# Patient Record
Sex: FEMALE | NOT HISPANIC OR LATINO
[De-identification: names, ages, dates, MRNs, and addresses within clinical notes are randomized per-mention and may not be internally consistent; named-entity substitution may affect disease eponyms.]

---

## 2024-05-13 PROBLEM — Z00.00 ENCOUNTER FOR PREVENTIVE HEALTH EXAMINATION: Status: ACTIVE | Noted: 2024-05-13

## 2024-05-14 ENCOUNTER — APPOINTMENT (OUTPATIENT)
Dept: UROLOGY | Facility: CLINIC | Age: 49
End: 2024-05-14
Payer: COMMERCIAL

## 2024-05-14 VITALS
WEIGHT: 125 LBS | HEART RATE: 72 BPM | OXYGEN SATURATION: 100 % | TEMPERATURE: 97.9 F | DIASTOLIC BLOOD PRESSURE: 61 MMHG | HEIGHT: 66 IN | SYSTOLIC BLOOD PRESSURE: 93 MMHG | BODY MASS INDEX: 20.09 KG/M2

## 2024-05-14 DIAGNOSIS — Z78.9 OTHER SPECIFIED HEALTH STATUS: ICD-10-CM

## 2024-05-14 DIAGNOSIS — M62.89 OTHER SPECIFIED DISORDERS OF MUSCLE: ICD-10-CM

## 2024-05-14 DIAGNOSIS — Z87.891 PERSONAL HISTORY OF NICOTINE DEPENDENCE: ICD-10-CM

## 2024-05-14 PROCEDURE — 99204 OFFICE O/P NEW MOD 45 MIN: CPT

## 2024-05-18 NOTE — LETTER BODY
[Dear  ___] : Dear  [unfilled], [Consult Letter:] : I had the pleasure of evaluating your patient, [unfilled]. [Please see my note below.] : Please see my note below. [Consult Closing:] : Thank you very much for allowing me to participate in the care of this patient.  If you have any questions, please do not hesitate to contact me. [Sincerely,] : Sincerely, [FreeTextEntry3] : Miller Cisse MD System Director Urogynecology/F[facility] Department of Urology [facility]    at [facility]  of Urology [facility] [employer] [facility] at [facility]

## 2024-05-18 NOTE — PHYSICAL EXAM
[General Appearance - Well Developed] : well developed [General Appearance - Well Nourished] : well nourished [Normal Appearance] : normal appearance [Well Groomed] : well groomed [General Appearance - In No Acute Distress] : no acute distress [] : no respiratory distress [Respiration, Rhythm And Depth] : normal respiratory rhythm and effort [Exaggerated Use Of Accessory Muscles For Inspiration] : no accessory muscle use [Urethral Meatus] : normal urethra [Urinary Bladder Findings] : the bladder was normal on palpation [External Female Genitalia] : normal external genitalia [Vagina] : normal vaginal exam [Cervix] : normal cervix [Uterus] : uterus was normal size, without masses or tenderness [Normal Station and Gait] : the gait and station were normal for the patient's age [Oriented To Time, Place, And Person] : oriented to person, place, and time [de-identified] : left post, right ant htl

## 2024-05-18 NOTE — ASSESSMENT
[FreeTextEntry1] :   Impression/plan: 49 yo woman with pelvic floor dysfunction and pelvic pain.  1. We reviewed her diagnosis of PFD and PP, rationale for treatment with physical therapy. All questions answered.  2. F/u in 3 months.

## 2024-05-18 NOTE — HISTORY OF PRESENT ILLNESS
[FreeTextEntry1] : PCP - [facility] - PCP - Tj Case MD  49 yo para 4 woman presents with 3 months c/o suprapubic pressure. She was seen by her gynecologist, transvaginal ultrasound unremarkable. He pelvic exam elicited bladder pain. He full urinary panel unremarkable, no infection, however given Cipro given anyway. She c/o suprapubic pressure with sitting or lying down. She denies any irr/obs symptoms.  She does have occasional noc x 1, but usually already awake.  She denies UTIs/STIs.  She had tried Advil, warm compresses.   She has been sexually active, not recently with this pelvic pain, no h/o dyspareunia in the past.   She does c/o constipation, manages with diet.   She has no pelvic surgical history

## 2024-09-17 ENCOUNTER — APPOINTMENT (OUTPATIENT)
Dept: UROLOGY | Facility: CLINIC | Age: 49
End: 2024-09-17